# Patient Record
Sex: MALE | Race: WHITE | URBAN - METROPOLITAN AREA
[De-identification: names, ages, dates, MRNs, and addresses within clinical notes are randomized per-mention and may not be internally consistent; named-entity substitution may affect disease eponyms.]

---

## 2019-07-03 ENCOUNTER — THERAPY VISIT (OUTPATIENT)
Dept: PHYSICAL THERAPY | Facility: CLINIC | Age: 79
End: 2019-07-03
Payer: COMMERCIAL

## 2019-07-03 DIAGNOSIS — M54.50 CHRONIC BILATERAL LOW BACK PAIN WITHOUT SCIATICA: ICD-10-CM

## 2019-07-03 DIAGNOSIS — G89.29 CHRONIC BILATERAL LOW BACK PAIN WITHOUT SCIATICA: ICD-10-CM

## 2019-07-03 PROCEDURE — 97161 PT EVAL LOW COMPLEX 20 MIN: CPT | Mod: GP | Performed by: PHYSICAL THERAPIST

## 2019-07-03 PROCEDURE — 97530 THERAPEUTIC ACTIVITIES: CPT | Mod: GP | Performed by: PHYSICAL THERAPIST

## 2019-07-03 PROCEDURE — 97110 THERAPEUTIC EXERCISES: CPT | Mod: GP | Performed by: PHYSICAL THERAPIST

## 2019-07-03 NOTE — LETTER
[facility] PHYSICAL THERAPY  [address]  [facility] [address]  [phone]    July 15, 2019    Re: Dwayne Escamilla   :   1940  MRN:  [id number]   REFERRING PHYSICIAN:   Baldomero Gupta    [facility] PHYSICAL THERAP[facility]    Date of Initial Evaluation:  7/3/2019  Visits:   1  Reason for Referral:  Chronic bilateral low back pain without sciatica    [facility] Initial Evaluation -- Lumbar  Date: July 3, 2019  Dwayne Escamilla is a 78 year old male with a lumbar spine condition.   Referral: GP  Work mechanical stresses:  NA  Employment status:  Retired  Leisure mechanical stresses: No formal exercise  Functional disability score (EV/STarT Back):  See flowsheets  VAS score (0-10): 7/10  Patient goals/expectations:  Reduce pain and be able to walk for longer periods.     HISTORY:  Present symptoms: (B) low back  Pain quality (sharp/shooting/stabbing/aching/burning/cramping):  Dull ache   Paresthesia (yes/no):  No  Present since (onset date): 1 year.  MD referral date 19.     Symptoms (improving/unchanging/worsening):  Unchanging.   Symptoms commenced as a result of: SAVANNA   Condition occurred in the following environment:   Unknown   Symptoms at onset (back/thigh/leg): back  Constant symptoms (back/thigh/leg):   Intermittent symptoms (back/thigh/leg): back  Symptoms are made worse with the following: Always Bending, Always Rising, Always Standing, Always Walking, Time of day - Always AM and Other - Lifting   Symptoms are made better with the following: Other - Aleve  Disturbed sleep (yes/no):  No   Sleeping postures (prone/sup/side R/L): Back, Rt side  Previous episodes (0/1-5/6-10/11+): 0   Year of first episode:   Previous history: No  Previous treatments: None            Re: Dwayne Escamilla   :   1940    Specific Questions:  Cough/Sneeze/Strain (pos/neg): Neg  Bowel/Bladder (normal/abnormal): Normal  Gait  (normal/abnormal): Abnormal - Difficulty straightening up at times, feels unsteady at times and weakness in legs.  Medications (nil/NSAIDS/analg/steroids/anticoag/other):  Other - Cardiac  Medical allergies:  PCN  General health (excellent/good/fair/poor):  Good  Pertinent medical history:  Asthma, Implanted device and Kidney disease  Imaging (None/Xray/MRI/Other):  None  Recent or major surgery (yes/no):  Prostate 15 yrs ago, Rt LONG 6 yrs ago, CABG 6 yrs ago  Night pain (yes/no): No  Accidents (yes/no): No  Unexplained weight loss (yes/no): No  Barriers at home: None  Other red flags: None    EXAMINATION    Posture:   Sitting (good/fair/poor): Poor  Standing (good/fair/poor):Poor  Lordosis (red/acc/normal): Reduced  Correction of posture (better/worse/no effect): No effect  Lateral Shift (right/left/nil): Nil  Relevant (yes/no):    Other Observations: NT    Neurological:  Motor deficit:  NT  Reflexes:  NT  Sensory deficit:  NT  Dural signs:  NT  Movement Loss:   Wes Mod Min Nil Pain   Flexion  X   Pull's (B) H-S   Extension X    Low back   Side Gliding R  X   Low back   Side Gliding L  X   Low back     Test Movements:   During: produces, abolishes, increases, decreases, no effect, centralizing, peripheralizing   After: better, worse, no better, no worse, no effect, centralized, peripheralized                Re: Dwayne Escamilla   :   1940    Pretest symptoms standin/10 pain   Symptoms During Symptoms After ROM increased ROM decreased No Effect   FIS        Rep FIS        EIS Produces    No Worse         Rep EIS Produces    No Worse      X   Pretest symptoms lying:     Symptoms During Symptoms After ROM increased ROM decreased No Effect   JOSÉ        Rep JOSÉ        EIL        Rep EIL        If required, pretest symptoms:    Symptoms During Symptoms After ROM increased ROM decreased No Effect   SGIS - R        Rep SGIS - R        SGIS - L        Rep SGIS - L        Static Tests:  Sitting slouched:    Sitting  erect:    Standing slouched   Standing erect:    Lying prone in extension:   Long sitting:    Other Tests:   Provisional Classification:  Other - Mechanically Inconclusive - Testing EIS   Principle of Management:  Education:  Posture, specificity of exercise and rationale with repeated movements  Equipment provided:  None  Mechanical therapy (Y/N):  Y   Extension principle:  EIS x 10-15 reps every 2 hrs    ASSESSMENT/PLAN:  Patient is a 78 year old male with lumbar complaints.    Patient has the following significant findings with corresponding treatment plan.                Diagnosis 1:  LBP    Pain -  self management, education, directional preference exercise and home program  Decreased ROM/flexibility - manual therapy, therapeutic exercise and home program  Decreased joint mobility - manual therapy, therapeutic exercise and home program  Decreased strength - therapeutic exercise, therapeutic activities and home program  Impaired balance - neuro re-education, therapeutic activities and home program  Impaired gait - gait training and home program  Impaired muscle performance - neuro re-education and home program  Decreased function - therapeutic activities and home program  Impaired posture - neuro re-education and home program    Re: Dwayne Escamilla   :   1940    ASSESSMENT/PLAN: (continued)  Previous and current functional limitations:  (See Goal Flow Sheet for this information)    Short term and Long term goals: (See Goal Flow Sheet for this information)   Communication ability:  Patient appears to be able to clearly communicate and understand verbal and written communication and follow directions correctly.  Treatment Explanation - The following has been discussed with the patient:   RX ordered/plan of care, Anticipated outcomes, Possible risks and side effects    This patient would benefit from PT intervention to resume normal activities.   Rehab potential is good.  Frequency:  1 X week, once  daily  Duration:  for 6 weeks  Discharge Plan:  Achieve all LTG.  Independent in home treatment program.  Reach maximal therapeutic benefit.    Thank you for your referral.    INQUIRIES        Therapist:    TIMOTHY CejaT, Cert. MDT   [facility]  [address] [facility] [address]  [facility] [address]  Phone: [phone]  Fax: [phone]

## 2019-07-03 NOTE — PROGRESS NOTES
[facility] Initial Evaluation -- Lumbar    Date: July 3, 2019  Dwayne Escamilla is a 78 year old male with a lumbar spine condition.   Referral: GP  Work mechanical stresses:  NA  Employment status:  Retired  Leisure mechanical stresses: No formal exercise  Functional disability score (EV/STarT Back):  See flowsheets  VAS score (0-10): 7/10  Patient goals/expectations:  Reduce pain and be able to walk for longer periods.     HISTORY:    Present symptoms: (B) low back  Pain quality (sharp/shooting/stabbing/aching/burning/cramping):  Dull ache   Paresthesia (yes/no):  No    Present since (onset date): 1 year.  MD referral date 7.1.19.     Symptoms (improving/unchanging/worsening):  Unchanging.     Symptoms commenced as a result of: SAVANNA   Condition occurred in the following environment:   Unknown     Symptoms at onset (back/thigh/leg): back  Constant symptoms (back/thigh/leg):   Intermittent symptoms (back/thigh/leg): back    Symptoms are made worse with the following: Always Bending, Always Rising, Always Standing, Always Walking, Time of day - Always AM and Other - Lifting   Symptoms are made better with the following: Other - Aleve    Disturbed sleep (yes/no):  No Sleeping postures (prone/sup/side R/L): Back, Rt side    Previous episodes (0/1-5/6-10/11+): 0 Year of first episode:     Previous history: No  Previous treatments: None      Specific Questions:  Cough/Sneeze/Strain (pos/neg): Neg  Bowel/Bladder (normal/abnormal): Normal  Gait (normal/abnormal): Abnormal - Difficulty straightening up at times, feels unsteady at times and weakness in legs.  Medications (nil/NSAIDS/analg/steroids/anticoag/other):  Other - Cardiac  Medical allergies:  PCN  General health (excellent/good/fair/poor):  Good  Pertinent medical history:  Asthma, Implanted device and Kidney disease  Imaging (None/Xray/MRI/Other):  None  Recent or major surgery (yes/no):  Prostate 15 yrs ago, Rt LONG 6 yrs ago, CABG 6 yrs  ago  Night pain (yes/no): No  Accidents (yes/no): No  Unexplained weight loss (yes/no): No  Barriers at home: None  Other red flags: None    EXAMINATION    Posture:   Sitting (good/fair/poor): Poor  Standing (good/fair/poor):Poor  Lordosis (red/acc/normal): Reduced  Correction of posture (better/worse/no effect): No effect    Lateral Shift (right/left/nil): Nil  Relevant (yes/no):    Other Observations: NT    Neurological:    Motor deficit:  NT  Reflexes:  NT  Sensory deficit:  NT  Dural signs:  NT    Movement Loss:   Wes Mod Min Nil Pain   Flexion  X   Pull's (B) H-S   Extension X    Low back   Side Gliding R  X   Low back   Side Gliding L  X   Low back     Test Movements:   During: produces, abolishes, increases, decreases, no effect, centralizing, peripheralizing   After: better, worse, no better, no worse, no effect, centralized, peripheralized    Pretest symptoms standin/10 pain   Symptoms During Symptoms After ROM increased ROM decreased No Effect   FIS        Rep FIS        EIS Produces    No Worse         Rep EIS Produces    No Worse      X   Pretest symptoms lying:     Symptoms During Symptoms After ROM increased ROM decreased No Effect   JOSÉ        Rep JOSÉ        EIL        Rep EIL        If required, pretest symptoms:    Symptoms During Symptoms After ROM increased ROM decreased No Effect   SGIS - R        Rep SGIS - R        SGIS - L        Rep SGIS - L          Static Tests:  Sitting slouched:    Sitting erect:    Standing slouched   Standing erect:    Lying prone in extension:   Long sitting:      Other Tests:     Provisional Classification:  Other - Mechanically Inconclusive - Testing EIS     Principle of Management:  Education:  Posture, specificity of exercise and rationale with repeated movements  Equipment provided:  None  Mechanical therapy (Y/N):  Y   Extension principle:  EIS x 10-15 reps every 2 hrs    ASSESSMENT/PLAN:    Patient is a 78 year old male with lumbar complaints.    Patient has  the following significant findings with corresponding treatment plan.                Diagnosis 1:  LBP    Pain -  self management, education, directional preference exercise and home program  Decreased ROM/flexibility - manual therapy, therapeutic exercise and home program  Decreased joint mobility - manual therapy, therapeutic exercise and home program  Decreased strength - therapeutic exercise, therapeutic activities and home program  Impaired balance - neuro re-education, therapeutic activities and home program  Impaired gait - gait training and home program  Impaired muscle performance - neuro re-education and home program  Decreased function - therapeutic activities and home program  Impaired posture - neuro re-education and home program    Previous and current functional limitations:  (See Goal Flow Sheet for this information)    Short term and Long term goals: (See Goal Flow Sheet for this information)     Communication ability:  Patient appears to be able to clearly communicate and understand verbal and written communication and follow directions correctly.  Treatment Explanation - The following has been discussed with the patient:   RX ordered/plan of care  Anticipated outcomes  Possible risks and side effects  This patient would benefit from PT intervention to resume normal activities.   Rehab potential is good.    Frequency:  1 X week, once daily  Duration:  for 6 weeks  Discharge Plan:  Achieve all LTG.  Independent in home treatment program.  Reach maximal therapeutic benefit.    Please refer to the daily flowsheet for treatment today, total treatment time and time spent performing 1:1 timed codes.

## 2019-07-11 ENCOUNTER — THERAPY VISIT (OUTPATIENT)
Dept: PHYSICAL THERAPY | Facility: CLINIC | Age: 79
End: 2019-07-11
Payer: COMMERCIAL

## 2019-07-11 DIAGNOSIS — M54.50 CHRONIC BILATERAL LOW BACK PAIN WITHOUT SCIATICA: ICD-10-CM

## 2019-07-11 DIAGNOSIS — G89.29 CHRONIC BILATERAL LOW BACK PAIN WITHOUT SCIATICA: ICD-10-CM

## 2019-07-11 PROCEDURE — 97110 THERAPEUTIC EXERCISES: CPT | Mod: GP | Performed by: PHYSICAL THERAPIST

## 2019-07-25 ENCOUNTER — THERAPY VISIT (OUTPATIENT)
Dept: PHYSICAL THERAPY | Facility: CLINIC | Age: 79
End: 2019-07-25
Payer: COMMERCIAL

## 2019-07-25 DIAGNOSIS — M54.50 CHRONIC BILATERAL LOW BACK PAIN WITHOUT SCIATICA: ICD-10-CM

## 2019-07-25 DIAGNOSIS — G89.29 CHRONIC BILATERAL LOW BACK PAIN WITHOUT SCIATICA: ICD-10-CM

## 2019-07-25 PROCEDURE — 97110 THERAPEUTIC EXERCISES: CPT | Mod: GP | Performed by: PHYSICAL THERAPIST

## 2019-08-15 ENCOUNTER — THERAPY VISIT (OUTPATIENT)
Dept: PHYSICAL THERAPY | Facility: CLINIC | Age: 79
End: 2019-08-15
Payer: COMMERCIAL

## 2019-08-15 DIAGNOSIS — G89.29 CHRONIC BILATERAL LOW BACK PAIN WITHOUT SCIATICA: ICD-10-CM

## 2019-08-15 DIAGNOSIS — M54.50 CHRONIC BILATERAL LOW BACK PAIN WITHOUT SCIATICA: ICD-10-CM

## 2019-08-15 PROCEDURE — 97110 THERAPEUTIC EXERCISES: CPT | Mod: GP | Performed by: PHYSICAL THERAPIST

## 2019-08-15 NOTE — PROGRESS NOTES
PROGRESS  REPORT    Progress reporting period is from 7.3.19 to 8.15.19.       SUBJECTIVE  Subjective changes noted by patient:  Pt reports that back is better.  Feels like he is walking more upright and is not really getting any real pain outside of feeling stiff in am.  Has not been as consistent with HEP as he should be and only doing a couple times/week.    Current Pain level: 0/10.     Initial Pain level: 7/10.   Changes in function:  Yes (See Goal flowsheet attached for changes in current functional level)  Adverse reaction to treatment or activity: None    OBJECTIVE  Objective: L/S AROM:  Flex WNL; Ext Mod loss; SG Mod loss (B).      ASSESSMENT/PLAN  Updated problem list and treatment plan:   Diagnosis 1:  LBP    Decreased ROM/flexibility - therapeutic exercise and home program  Decreased joint mobility - therapeutic exercise and home program  Decreased strength - therapeutic exercise and home program  Impaired muscle performance - home program  STG/LTGs have been met or progress has been made towards goals:  Yes (See Goal flow sheet completed today.)  Assessment of Progress: The patient's condition is improving.  The patient has met all of their long term goals.  Self Management Plans:  Patient is independent in a home treatment program.  Patient is independent in self management of symptoms.  I have re-evaluated this patient and find that the nature, scope, duration and intensity of the therapy is appropriate for the medical condition of the patient.  Dwayne continues to require the following intervention to meet STG and LTG's:  PT intervention is no longer required to meet STG/LTG.    Recommendations:  Pt will continue with HEP as instructed.  Follow up prn over the next 6 weeks if problems arise/symptoms worsen.  D/C at that time if pt does not return and this progress note to serve as D/C status.

## 2019-08-15 NOTE — LETTER
[facility] ATHLETIC [facility] PHYSICAL THERAPY  [address]   [place] [address]  [phone]    August 15, 2019    Re: Dwayne Escamilla   :   1940  MRN:  [id number]   REFERRING PHYSICIAN:   Baldomero Gupta    [facility] ATHLETIC [facility] PHYSICAL THERAPY    Date of Initial Evaluation:  7/3/2019  Visits:    4  Reason for Referral:  Chronic bilateral low back pain without sciatica    PROGRESS  REPORT:  Progress reporting period is from .3.19 to 8.15..       SUBJECTIVE  Subjective changes noted by patient:  Pt reports that back is better.  Feels like he is walking more upright and is not really getting any real pain outside of feeling stiff in am.  Has not been as consistent with HEP as he should be and only doing a couple times/week.    Current Pain level: 0/10.   Initial Pain level: 7/10.   Changes in function:  Yes (See Goal flowsheet attached for changes in current functional level).  Adverse reaction to treatment or activity: None    OBJECTIVE: Objective: L/S AROM:  Flex WNL; Ext Mod loss; SG Mod loss (B).    ASSESSMENT/PLAN  Updated problem list and treatment plan:   Diagnosis 1:  LBP  Decreased ROM/flexibility - therapeutic exercise and home program  Decreased joint mobility - therapeutic exercise and home program  Decreased strength - therapeutic exercise and home program  Impaired muscle performance - home program  STG/LTGs have been met or progress has been made towards goals:  Yes (See Goal flow sheet completed today.)  Assessment of Progress: The patient's condition is improving.  The patient has met all of their long term goals.  Self Management Plans:  Patient is independent in a home treatment program.  Patient is independent in self management of symptoms.  I have re-evaluated this patient and find that the nature, scope, duration and intensity of the therapy is appropriate for the medical condition of the patient.  Dwayne continues to require the  following intervention to meet STG and LTG's:  PT intervention is no longer required to meet STG/LTG.              Re: Dwayne Escamilla   :   1940    Recommendations:  Pt will continue with HEP as instructed.  Follow up prn over the next 6 weeks if problems arise/symptoms worsen.  D/C at that time if pt does not return and this progress note to serve as D/C status.    Thank you for your referral.    INQUIRIES        Therapist:  TIMOTHY CejaT, Cert. MDT  [facility]ERAP[facility]  [address] [facility] [address]  [facility] [address]  Phone: [phone]  Fax: [phone]

## 2020-01-09 PROBLEM — M54.50 CHRONIC BILATERAL LOW BACK PAIN WITHOUT SCIATICA: Status: RESOLVED | Noted: 2019-07-03 | Resolved: 2020-01-09

## 2020-01-09 PROBLEM — G89.29 CHRONIC BILATERAL LOW BACK PAIN WITHOUT SCIATICA: Status: RESOLVED | Noted: 2019-07-03 | Resolved: 2020-01-09

## 2021-05-31 ENCOUNTER — RECORDS - HEALTHEAST (OUTPATIENT)
Dept: ADMINISTRATIVE | Facility: CLINIC | Age: 81
End: 2021-05-31